# Patient Record
Sex: FEMALE | URBAN - METROPOLITAN AREA
[De-identification: names, ages, dates, MRNs, and addresses within clinical notes are randomized per-mention and may not be internally consistent; named-entity substitution may affect disease eponyms.]

---
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
## 2025-01-28 ENCOUNTER — APPOINTMENT (OUTPATIENT)
Dept: ULTRASOUND IMAGING | Age: 20
End: 2025-01-28
Payer: MEDICAID

## 2025-01-28 ENCOUNTER — HOSPITAL ENCOUNTER (EMERGENCY)
Age: 20
Discharge: HOME | End: 2025-01-28
Payer: MEDICAID

## 2025-01-28 VITALS
TEMPERATURE: 97.7 F | HEIGHT: 65 IN | RESPIRATION RATE: 14 BRPM | HEART RATE: 88 BPM | WEIGHT: 143.4 LBS | DIASTOLIC BLOOD PRESSURE: 80 MMHG | SYSTOLIC BLOOD PRESSURE: 113 MMHG | OXYGEN SATURATION: 99 % | BODY MASS INDEX: 23.89 KG/M2

## 2025-01-28 DIAGNOSIS — R10.2 PELVIC CRAMPING: ICD-10-CM

## 2025-01-28 DIAGNOSIS — Z3A.18 18 WEEKS GESTATION OF PREGNANCY: Primary | ICD-10-CM

## 2025-01-28 LAB
ABO + RH BLD: NORMAL
BILIRUB UR QL STRIP.AUTO: NEGATIVE
CLARITY UR: ABNORMAL
COLOR UR: YELLOW
GLUCOSE UR STRIP.AUTO-MCNC: NEGATIVE MG/DL
HGB UR QL STRIP.AUTO: NEGATIVE
KETONES UR STRIP.AUTO-MCNC: NEGATIVE MG/DL
LEUKOCYTE ESTERASE UR QL STRIP.AUTO: NEGATIVE
NITRITE UR QL STRIP.AUTO: NEGATIVE
PH UR STRIP.AUTO: 6 [PH] (ref 5–8)
PROT UR STRIP.AUTO-MCNC: NEGATIVE MG/DL
SP GR UR STRIP.AUTO: 1.02 (ref 1–1.03)
UA COMPLETE W REFLEX CULTURE PNL UR: ABNORMAL
UA DIPSTICK W REFLEX MICRO PNL UR: ABNORMAL
URN SPEC COLLECT METH UR: ABNORMAL
UROBILINOGEN UR STRIP-ACNC: 0.2 E.U./DL

## 2025-01-28 PROCEDURE — 81003 URINALYSIS AUTO W/O SCOPE: CPT

## 2025-01-28 PROCEDURE — 86900 BLOOD TYPING SEROLOGIC ABO: CPT

## 2025-01-28 PROCEDURE — 76805 OB US >/= 14 WKS SNGL FETUS: CPT

## 2025-01-28 PROCEDURE — 86901 BLOOD TYPING SEROLOGIC RH(D): CPT

## 2025-01-28 PROCEDURE — 99284 EMERGENCY DEPT VISIT MOD MDM: CPT

## 2025-01-28 ASSESSMENT — PAIN - FUNCTIONAL ASSESSMENT: PAIN_FUNCTIONAL_ASSESSMENT: NONE - DENIES PAIN

## 2025-01-28 NOTE — ED PROVIDER NOTES
[facility] EMERGENCY DEPARTMENT  EMERGENCY DEPARTMENT ENCOUNTER        Pt Name: Fabián Partida  MRN: [id number]  Birthdate 2005  Date of evaluation: 1/28/2025  Provider: Hellen Mays PA-C  PCP: Patrica Austin MD  Note Started: 12:53 PM EST 1/28/25      MAY. I have evaluated this patient.        CHIEF COMPLAINT       Chief Complaint   Patient presents with    Abdominal Cramping     Patient states that she is approximately 17 weeks pregnant and hasn't established with OB, states she is having lower abdominal cramping that she woke up this AM. Had \"pink\" area on toilet tissue when she wiped last night       HISTORY OF PRESENT ILLNESS: 1 or more Elements     History From: Patient and family            Chief Complaint: Abdominal cramping    Fabián Partida is a 19 y.o. female who presents G2, P0 coming in approximately 18 weeks pregnant by last normal menstrual period with bilateral lower pelvic cramping.  She states she had a mental breakdown last night when she woke up in the middle of the night she started cramping.  These are not consistent.  Seems to be improving throughout the day.  She states when she was going to the bathroom in the middle the night she noticed some pink when she wiped on the toilet paper.  She denies heavy vaginal bleeding or clots.  She denies any vomiting diarrhea fever.  She has not followed up with OB/GYN this pregnancy due to insurance issues.  she states she had a miscarriage last February.  No intervention was needed.  She did have a ultrasound around 9 weeks due to some bleeding at that time.  She states her urine does look cloudy to her but she is having no dysuria frequency or urgency.    Nursing Notes were all reviewed and agreed with or any disagreements were addressed in the HPI.    REVIEW OF SYSTEMS :      Review of Systems    Positives and Pertinent negatives as per HPI.     SURGICAL HISTORY   No past surgical history on file.    CURRENTMEDICATIONS

## 2025-01-29 NOTE — PROGRESS NOTES
Patient is not established with [facility].  [facility] is not currently on call for unassigned ER call.

## 2025-02-24 LAB
ABO, EXTERNAL RESULT: NORMAL
HEP B, EXTERNAL RESULT: NEGATIVE
HIV, EXTERNAL RESULT: NEGATIVE
RH FACTOR, EXTERNAL RESULT: POSITIVE
RPR, EXTERNAL RESULT: NONREACTIVE
RUBELLA TITER, EXTERNAL RESULT: NORMAL

## 2025-04-02 ENCOUNTER — HOSPITAL ENCOUNTER (OUTPATIENT)
Age: 20
Discharge: HOME | End: 2025-04-02
Attending: OBSTETRICS & GYNECOLOGY | Admitting: OBSTETRICS & GYNECOLOGY
Payer: MEDICAID

## 2025-04-02 ENCOUNTER — HOSPITAL ENCOUNTER (EMERGENCY)
Age: 20
Discharge: HOME | End: 2025-04-02
Attending: STUDENT IN AN ORGANIZED HEALTH CARE EDUCATION/TRAINING PROGRAM

## 2025-04-02 VITALS
TEMPERATURE: 98 F | HEIGHT: 65 IN | BODY MASS INDEX: 26.08 KG/M2 | SYSTOLIC BLOOD PRESSURE: 125 MMHG | RESPIRATION RATE: 16 BRPM | HEART RATE: 87 BPM | DIASTOLIC BLOOD PRESSURE: 62 MMHG

## 2025-04-02 VITALS
TEMPERATURE: 97.3 F | BODY MASS INDEX: 26.08 KG/M2 | RESPIRATION RATE: 17 BRPM | OXYGEN SATURATION: 98 % | DIASTOLIC BLOOD PRESSURE: 72 MMHG | SYSTOLIC BLOOD PRESSURE: 128 MMHG | HEART RATE: 111 BPM | WEIGHT: 156.75 LBS

## 2025-04-02 PROCEDURE — 99202 OFFICE O/P NEW SF 15 MIN: CPT

## 2025-04-02 RX ORDER — POLYETHYLENE GLYCOL 3350 17 G/17G
17 POWDER, FOR SOLUTION ORAL DAILY
Qty: 1 EACH | Refills: 1 | Status: SHIPPED | OUTPATIENT
Start: 2025-04-02 | End: 2025-05-02

## 2025-04-02 RX ORDER — BISACODYL 10 MG
10 SUPPOSITORY, RECTAL RECTAL DAILY
Qty: 30 SUPPOSITORY | Refills: 0 | Status: SHIPPED | OUTPATIENT
Start: 2025-04-02 | End: 2025-05-02

## 2025-04-03 NOTE — PROGRESS NOTES
Pt presents to T1 at this time with primary c/o \"I haven't felt my baby move since yesterday evening.\" Pt denies LOF or vaginal bleeding. Pt denies contractions. Pt placed on EFM and vital signs obtained and WNL. Pt also states \"I haven't pooped in 5 days and I've taken magnesium citrate and a hemorrhoid suppository.\" Will update Dr. Castillo about pt arrival and current complaint.

## 2025-04-03 NOTE — H&P
Triage History and Physical      Fabián Partida  [id number]      Subjective:   Fabián Partida is a 19 y.o.  at 27w4d by LMP who receives her prenatal care at [facility]. Patient presents with c/o not feeling FM since today and constipation with no BM since 5 days. Pt reports h/o hemorrhoids even prior to pregnancy, she reports painful BM, has been applying hemorrhoidal cream. She tried Mag citrate for constipation with no relief. She reports not drinking adequate water since few days. She also reports not eating as much. Denies ctxs, LOF or VB.     Antepartum History:  Patient Active Problem List   Diagnosis    Irritable bowel syndrome    Anxiety and depression    Hypermobile joints    Allergic rhinitis    Dysmenorrhea    Menorrhagia with regular cycle    Excessive daytime sleepiness         OB History:  OB History    Para Term  AB Living   2 0 0 0 1 0   SAB IAB Ectopic Molar Multiple Live Births   0 0 0 0 0 0      # Outcome Date GA Lbr Cleve/2nd Weight Sex Type Anes PTL Lv   2 Current            1 AB                  History:  Past Medical History:   Past Medical History:   Diagnosis Date    Autism spectrum disorder     Chronic migraine without aura     COVID-19 2024    Depression     Dysautonomia (HCC)     Dysmenorrhea     IBS (irritable bowel syndrome)     Insomnia     Irregular uterine bleeding     Lactose intolerance     Menorrhagia     OCD (obsessive compulsive disorder)     PTSD (post-traumatic stress disorder)      Past Surgical History: History reviewed. No pertinent surgical history.    Social History:   Denies EtOH, tobacco abuse or illicit drug use   Social History     Socioeconomic History    Marital status: Single     Spouse name: Not on file    Number of children: Not on file    Years of education: Not on file    Highest education level: Not on file   Occupational

## 2025-04-03 NOTE — PROGRESS NOTES
D/c instructions given. Pt verbalized understanding and denied questions. Pt left OB unit @ 2127 in stable condition, ambulatory and undelivered. Not in active labor.

## 2025-06-06 LAB — GBS, EXTERNAL RESULT: NEGATIVE

## 2025-06-27 ENCOUNTER — HOSPITAL ENCOUNTER (INPATIENT)
Age: 20
LOS: 3 days | Discharge: HOME | DRG: 560 | End: 2025-06-30
Attending: STUDENT IN AN ORGANIZED HEALTH CARE EDUCATION/TRAINING PROGRAM | Admitting: STUDENT IN AN ORGANIZED HEALTH CARE EDUCATION/TRAINING PROGRAM
Payer: MEDICAID

## 2025-06-27 ENCOUNTER — APPOINTMENT (OUTPATIENT)
Dept: LABOR AND DELIVERY | Age: 20
DRG: 560 | End: 2025-06-27
Payer: MEDICAID

## 2025-06-27 PROBLEM — Z34.90 ENCOUNTER FOR ELECTIVE INDUCTION OF LABOR: Status: ACTIVE | Noted: 2025-06-27

## 2025-06-27 LAB
ABO/RH: NORMAL
AMPHETAMINES UR QL SCN>1000 NG/ML: ABNORMAL
ANTIBODY SCREEN: NORMAL
BARBITURATES UR QL SCN>200 NG/ML: ABNORMAL
BASOPHILS # BLD: 0.1 K/UL (ref 0–0.2)
BASOPHILS NFR BLD: 0.5 %
BENZODIAZ UR QL SCN>200 NG/ML: ABNORMAL
BUPRENORPHINE+NOR UR QL SCN: ABNORMAL
CANNABINOIDS UR QL SCN>50 NG/ML: POSITIVE
COCAINE UR QL SCN: ABNORMAL
DEPRECATED RDW RBC AUTO: 15.5 % (ref 12.4–15.4)
DRUG SCREEN COMMENT UR-IMP: ABNORMAL
EOSINOPHIL # BLD: 0.1 K/UL (ref 0–0.6)
EOSINOPHIL NFR BLD: 1.1 %
FENTANYL SCREEN, URINE: ABNORMAL
HCT VFR BLD AUTO: 31.3 % (ref 36–48)
HGB BLD-MCNC: 10.1 G/DL (ref 12–16)
LYMPHOCYTES # BLD: 2 K/UL (ref 1–5.1)
LYMPHOCYTES NFR BLD: 15.4 %
MCH RBC QN AUTO: 24.1 PG (ref 26–34)
MCHC RBC AUTO-ENTMCNC: 32.3 G/DL (ref 31–36)
MCV RBC AUTO: 74.7 FL (ref 80–100)
METHADONE UR QL SCN>300 NG/ML: ABNORMAL
MONOCYTES # BLD: 1.3 K/UL (ref 0–1.3)
MONOCYTES NFR BLD: 10.2 %
NEUTROPHILS # BLD: 9.3 K/UL (ref 1.7–7.7)
NEUTROPHILS NFR BLD: 72.8 %
OPIATES UR QL SCN>300 NG/ML: ABNORMAL
OXYCODONE UR QL SCN: ABNORMAL
PCP UR QL SCN>25 NG/ML: ABNORMAL
PH UR STRIP: 6 [PH]
PLATELET # BLD AUTO: 273 K/UL (ref 135–450)
PMV BLD AUTO: 8.4 FL (ref 5–10.5)
RBC # BLD AUTO: 4.19 M/UL (ref 4–5.2)
WBC # BLD AUTO: 12.8 K/UL (ref 4–11)

## 2025-06-27 PROCEDURE — 6370000000 HC RX 637 (ALT 250 FOR IP): Performed by: STUDENT IN AN ORGANIZED HEALTH CARE EDUCATION/TRAINING PROGRAM

## 2025-06-27 PROCEDURE — 86901 BLOOD TYPING SEROLOGIC RH(D): CPT

## 2025-06-27 PROCEDURE — 86780 TREPONEMA PALLIDUM: CPT

## 2025-06-27 PROCEDURE — 85025 COMPLETE CBC W/AUTO DIFF WBC: CPT

## 2025-06-27 PROCEDURE — 1220000000 HC SEMI PRIVATE OB R&B

## 2025-06-27 PROCEDURE — 6360000002 HC RX W HCPCS: Performed by: STUDENT IN AN ORGANIZED HEALTH CARE EDUCATION/TRAINING PROGRAM

## 2025-06-27 PROCEDURE — 86900 BLOOD TYPING SEROLOGIC ABO: CPT

## 2025-06-27 PROCEDURE — 80307 DRUG TEST PRSMV CHEM ANLYZR: CPT

## 2025-06-27 PROCEDURE — 2580000003 HC RX 258: Performed by: STUDENT IN AN ORGANIZED HEALTH CARE EDUCATION/TRAINING PROGRAM

## 2025-06-27 PROCEDURE — 86850 RBC ANTIBODY SCREEN: CPT

## 2025-06-27 RX ORDER — SODIUM CHLORIDE, SODIUM LACTATE, POTASSIUM CHLORIDE, AND CALCIUM CHLORIDE .6; .31; .03; .02 G/100ML; G/100ML; G/100ML; G/100ML
1000 INJECTION, SOLUTION INTRAVENOUS PRN
Status: DISCONTINUED | OUTPATIENT
Start: 2025-06-27 | End: 2025-06-28

## 2025-06-27 RX ORDER — ONDANSETRON 2 MG/ML
4 INJECTION INTRAMUSCULAR; INTRAVENOUS EVERY 6 HOURS PRN
Status: DISCONTINUED | OUTPATIENT
Start: 2025-06-27 | End: 2025-06-28

## 2025-06-27 RX ORDER — TRANEXAMIC ACID 10 MG/ML
1000 INJECTION, SOLUTION INTRAVENOUS
Status: DISCONTINUED | OUTPATIENT
Start: 2025-06-27 | End: 2025-06-28

## 2025-06-27 RX ORDER — SODIUM CHLORIDE 0.9 % (FLUSH) 0.9 %
5-40 SYRINGE (ML) INJECTION EVERY 12 HOURS SCHEDULED
Status: DISCONTINUED | OUTPATIENT
Start: 2025-06-27 | End: 2025-06-28

## 2025-06-27 RX ORDER — SODIUM CHLORIDE, SODIUM LACTATE, POTASSIUM CHLORIDE, AND CALCIUM CHLORIDE .6; .31; .03; .02 G/100ML; G/100ML; G/100ML; G/100ML
500 INJECTION, SOLUTION INTRAVENOUS PRN
Status: DISCONTINUED | OUTPATIENT
Start: 2025-06-27 | End: 2025-06-28

## 2025-06-27 RX ORDER — NALBUPHINE HYDROCHLORIDE 10 MG/ML
10 INJECTION INTRAMUSCULAR; INTRAVENOUS; SUBCUTANEOUS
Status: DISCONTINUED | OUTPATIENT
Start: 2025-06-27 | End: 2025-06-28

## 2025-06-27 RX ORDER — SODIUM CHLORIDE, SODIUM LACTATE, POTASSIUM CHLORIDE, CALCIUM CHLORIDE 600; 310; 30; 20 MG/100ML; MG/100ML; MG/100ML; MG/100ML
INJECTION, SOLUTION INTRAVENOUS CONTINUOUS
Status: DISCONTINUED | OUTPATIENT
Start: 2025-06-27 | End: 2025-06-28

## 2025-06-27 RX ORDER — CARBOPROST TROMETHAMINE 250 UG/ML
250 INJECTION, SOLUTION INTRAMUSCULAR PRN
Status: DISCONTINUED | OUTPATIENT
Start: 2025-06-27 | End: 2025-06-28

## 2025-06-27 RX ORDER — MISOPROSTOL 100 UG/1
400 TABLET ORAL PRN
Status: DISCONTINUED | OUTPATIENT
Start: 2025-06-27 | End: 2025-06-28

## 2025-06-27 RX ORDER — SODIUM CHLORIDE 0.9 % (FLUSH) 0.9 %
5-40 SYRINGE (ML) INJECTION PRN
Status: DISCONTINUED | OUTPATIENT
Start: 2025-06-27 | End: 2025-06-28

## 2025-06-27 RX ORDER — ONDANSETRON 4 MG/1
4 TABLET, ORALLY DISINTEGRATING ORAL EVERY 8 HOURS PRN
Status: DISCONTINUED | OUTPATIENT
Start: 2025-06-27 | End: 2025-06-28

## 2025-06-27 RX ORDER — SODIUM CHLORIDE 9 MG/ML
INJECTION, SOLUTION INTRAVENOUS PRN
Status: DISCONTINUED | OUTPATIENT
Start: 2025-06-27 | End: 2025-06-28

## 2025-06-27 RX ORDER — DOCUSATE SODIUM 100 MG/1
100 CAPSULE, LIQUID FILLED ORAL 2 TIMES DAILY
Status: DISCONTINUED | OUTPATIENT
Start: 2025-06-27 | End: 2025-06-28

## 2025-06-27 RX ORDER — BUTORPHANOL TARTRATE 1 MG/ML
1 INJECTION, SOLUTION INTRAMUSCULAR; INTRAVENOUS
Status: DISCONTINUED | OUTPATIENT
Start: 2025-06-27 | End: 2025-06-28

## 2025-06-27 RX ORDER — METHYLERGONOVINE MALEATE 0.2 MG/ML
200 INJECTION INTRAVENOUS PRN
Status: DISCONTINUED | OUTPATIENT
Start: 2025-06-27 | End: 2025-06-28

## 2025-06-27 RX ORDER — TERBUTALINE SULFATE 1 MG/ML
0.25 INJECTION SUBCUTANEOUS
Status: DISCONTINUED | OUTPATIENT
Start: 2025-06-27 | End: 2025-06-28

## 2025-06-27 RX ADMIN — NALBUPHINE HYDROCHLORIDE 10 MG: 10 INJECTION INTRAMUSCULAR; INTRAVENOUS; SUBCUTANEOUS at 22:56

## 2025-06-27 RX ADMIN — Medication 50 MCG: at 20:20

## 2025-06-27 RX ADMIN — SODIUM CHLORIDE, SODIUM LACTATE, POTASSIUM CHLORIDE, AND CALCIUM CHLORIDE 500 ML: .6; .31; .03; .02 INJECTION, SOLUTION INTRAVENOUS at 21:12

## 2025-06-27 ASSESSMENT — PAIN DESCRIPTION - LOCATION: LOCATION: ABDOMEN

## 2025-06-27 ASSESSMENT — PAIN DESCRIPTION - DESCRIPTORS: DESCRIPTORS: ACHING

## 2025-06-27 ASSESSMENT — PAIN SCALES - GENERAL: PAINLEVEL_OUTOF10: 6

## 2025-06-27 ASSESSMENT — PAIN DESCRIPTION - ORIENTATION: ORIENTATION: LOWER

## 2025-06-28 ENCOUNTER — ANESTHESIA EVENT (OUTPATIENT)
Dept: LABOR AND DELIVERY | Age: 20
DRG: 560 | End: 2025-06-28
Payer: MEDICAID

## 2025-06-28 ENCOUNTER — ANESTHESIA (OUTPATIENT)
Dept: LABOR AND DELIVERY | Age: 20
DRG: 560 | End: 2025-06-28
Payer: MEDICAID

## 2025-06-28 PROBLEM — Z37.9 VACUUM-ASSISTED VAGINAL DELIVERY: Status: ACTIVE | Noted: 2025-06-28

## 2025-06-28 LAB — REAGIN+T PALLIDUM IGG+IGM SERPL-IMP: NORMAL

## 2025-06-28 PROCEDURE — 6370000000 HC RX 637 (ALT 250 FOR IP)

## 2025-06-28 PROCEDURE — 6360000002 HC RX W HCPCS: Performed by: STUDENT IN AN ORGANIZED HEALTH CARE EDUCATION/TRAINING PROGRAM

## 2025-06-28 PROCEDURE — 6360000002 HC RX W HCPCS: Performed by: REGISTERED NURSE

## 2025-06-28 PROCEDURE — 0KQM0ZZ REPAIR PERINEUM MUSCLE, OPEN APPROACH: ICD-10-PCS | Performed by: STUDENT IN AN ORGANIZED HEALTH CARE EDUCATION/TRAINING PROGRAM

## 2025-06-28 PROCEDURE — 2580000003 HC RX 258: Performed by: STUDENT IN AN ORGANIZED HEALTH CARE EDUCATION/TRAINING PROGRAM

## 2025-06-28 PROCEDURE — 1220000000 HC SEMI PRIVATE OB R&B

## 2025-06-28 PROCEDURE — 7200000001 HC VAGINAL DELIVERY

## 2025-06-28 PROCEDURE — 2500000003 HC RX 250 WO HCPCS: Performed by: REGISTERED NURSE

## 2025-06-28 PROCEDURE — 6370000000 HC RX 637 (ALT 250 FOR IP): Performed by: STUDENT IN AN ORGANIZED HEALTH CARE EDUCATION/TRAINING PROGRAM

## 2025-06-28 PROCEDURE — 2500000003 HC RX 250 WO HCPCS: Performed by: STUDENT IN AN ORGANIZED HEALTH CARE EDUCATION/TRAINING PROGRAM

## 2025-06-28 PROCEDURE — 3E0DXGC INTRODUCTION OF OTHER THERAPEUTIC SUBSTANCE INTO MOUTH AND PHARYNX, EXTERNAL APPROACH: ICD-10-PCS | Performed by: STUDENT IN AN ORGANIZED HEALTH CARE EDUCATION/TRAINING PROGRAM

## 2025-06-28 PROCEDURE — 3700000025 EPIDURAL BLOCK: Performed by: ANESTHESIOLOGY

## 2025-06-28 RX ORDER — SIMETHICONE 80 MG
80 TABLET,CHEWABLE ORAL EVERY 6 HOURS PRN
Status: DISCONTINUED | OUTPATIENT
Start: 2025-06-28 | End: 2025-06-30 | Stop reason: HOSPADM

## 2025-06-28 RX ORDER — LIDOCAINE HYDROCHLORIDE AND EPINEPHRINE BITARTRATE 20; .01 MG/ML; MG/ML
INJECTION, SOLUTION SUBCUTANEOUS
Status: DISCONTINUED | OUTPATIENT
Start: 2025-06-28 | End: 2025-06-28 | Stop reason: SDUPTHER

## 2025-06-28 RX ORDER — SODIUM CHLORIDE 0.9 % (FLUSH) 0.9 %
5-40 SYRINGE (ML) INJECTION EVERY 12 HOURS SCHEDULED
Status: DISCONTINUED | OUTPATIENT
Start: 2025-06-28 | End: 2025-06-30 | Stop reason: HOSPADM

## 2025-06-28 RX ORDER — BUPROPION HYDROCHLORIDE 150 MG/1
150 TABLET ORAL DAILY
Status: DISCONTINUED | OUTPATIENT
Start: 2025-06-28 | End: 2025-06-30 | Stop reason: HOSPADM

## 2025-06-28 RX ORDER — ONDANSETRON 4 MG/1
4 TABLET, ORALLY DISINTEGRATING ORAL EVERY 6 HOURS PRN
Status: DISCONTINUED | OUTPATIENT
Start: 2025-06-28 | End: 2025-06-30 | Stop reason: HOSPADM

## 2025-06-28 RX ORDER — FAMOTIDINE 20 MG/1
20 TABLET, FILM COATED ORAL 2 TIMES DAILY
Status: DISCONTINUED | OUTPATIENT
Start: 2025-06-28 | End: 2025-06-30 | Stop reason: HOSPADM

## 2025-06-28 RX ORDER — SODIUM CHLORIDE 9 MG/ML
INJECTION, SOLUTION INTRAVENOUS PRN
Status: DISCONTINUED | OUTPATIENT
Start: 2025-06-28 | End: 2025-06-30 | Stop reason: HOSPADM

## 2025-06-28 RX ORDER — PRENATAL WITH FERROUS FUM AND FOLIC ACID 3080; 920; 120; 400; 22; 1.84; 3; 20; 10; 1; 12; 200; 27; 25; 2 [IU]/1; [IU]/1; MG/1; [IU]/1; MG/1; MG/1; MG/1; MG/1; MG/1; MG/1; UG/1; MG/1; MG/1; MG/1; MG/1
1 TABLET ORAL DAILY
Status: DISCONTINUED | OUTPATIENT
Start: 2025-06-28 | End: 2025-06-30 | Stop reason: HOSPADM

## 2025-06-28 RX ORDER — OXYCODONE HYDROCHLORIDE 5 MG/1
5 TABLET ORAL EVERY 4 HOURS PRN
Status: DISCONTINUED | OUTPATIENT
Start: 2025-06-28 | End: 2025-06-30 | Stop reason: HOSPADM

## 2025-06-28 RX ORDER — FERROUS SULFATE 325(65) MG
325 TABLET ORAL
Status: DISCONTINUED | OUTPATIENT
Start: 2025-06-29 | End: 2025-06-30 | Stop reason: HOSPADM

## 2025-06-28 RX ORDER — BUPIVACAINE HYDROCHLORIDE 5 MG/ML
INJECTION, SOLUTION EPIDURAL; INTRACAUDAL; PERINEURAL
Status: DISCONTINUED | OUTPATIENT
Start: 2025-06-28 | End: 2025-06-28 | Stop reason: SDUPTHER

## 2025-06-28 RX ORDER — DOCUSATE SODIUM 100 MG/1
100 CAPSULE, LIQUID FILLED ORAL 2 TIMES DAILY
Status: DISCONTINUED | OUTPATIENT
Start: 2025-06-28 | End: 2025-06-30 | Stop reason: HOSPADM

## 2025-06-28 RX ORDER — ONDANSETRON 2 MG/ML
4 INJECTION INTRAMUSCULAR; INTRAVENOUS EVERY 6 HOURS PRN
Status: DISCONTINUED | OUTPATIENT
Start: 2025-06-28 | End: 2025-06-30 | Stop reason: HOSPADM

## 2025-06-28 RX ORDER — SODIUM CHLORIDE, SODIUM LACTATE, POTASSIUM CHLORIDE, CALCIUM CHLORIDE 600; 310; 30; 20 MG/100ML; MG/100ML; MG/100ML; MG/100ML
INJECTION, SOLUTION INTRAVENOUS CONTINUOUS
Status: DISCONTINUED | OUTPATIENT
Start: 2025-06-28 | End: 2025-06-30 | Stop reason: HOSPADM

## 2025-06-28 RX ORDER — ACETAMINOPHEN 500 MG
1000 TABLET ORAL EVERY 8 HOURS
Status: DISCONTINUED | OUTPATIENT
Start: 2025-06-28 | End: 2025-06-30 | Stop reason: HOSPADM

## 2025-06-28 RX ORDER — SODIUM CHLORIDE 0.9 % (FLUSH) 0.9 %
5-40 SYRINGE (ML) INJECTION PRN
Status: DISCONTINUED | OUTPATIENT
Start: 2025-06-28 | End: 2025-06-30 | Stop reason: HOSPADM

## 2025-06-28 RX ORDER — IBUPROFEN 800 MG/1
TABLET, FILM COATED ORAL
Status: COMPLETED
Start: 2025-06-28 | End: 2025-06-28

## 2025-06-28 RX ORDER — IBUPROFEN 800 MG/1
800 TABLET, FILM COATED ORAL EVERY 8 HOURS
Status: DISCONTINUED | OUTPATIENT
Start: 2025-06-28 | End: 2025-06-30 | Stop reason: HOSPADM

## 2025-06-28 RX ORDER — EPHEDRINE SULFATE 50 MG/ML
INJECTION INTRAVENOUS
Status: DISCONTINUED
Start: 2025-06-28 | End: 2025-06-28

## 2025-06-28 RX ADMIN — IBUPROFEN 800 MG: 800 TABLET, FILM COATED ORAL at 21:42

## 2025-06-28 RX ADMIN — ACETAMINOPHEN 1000 MG: 500 TABLET ORAL at 18:09

## 2025-06-28 RX ADMIN — Medication 10 ML: at 21:43

## 2025-06-28 RX ADMIN — SODIUM CHLORIDE, SODIUM LACTATE, POTASSIUM CHLORIDE, AND CALCIUM CHLORIDE 1000 ML: .6; .31; .03; .02 INJECTION, SOLUTION INTRAVENOUS at 01:42

## 2025-06-28 RX ADMIN — Medication 15 ML/HR: at 00:54

## 2025-06-28 RX ADMIN — BUPIVACAINE HYDROCHLORIDE 10 ML: 5 INJECTION, SOLUTION EPIDURAL; INTRACAUDAL; PERINEURAL at 01:47

## 2025-06-28 RX ADMIN — IBUPROFEN 800 MG: 800 TABLET, FILM COATED ORAL at 10:10

## 2025-06-28 RX ADMIN — LIDOCAINE HYDROCHLORIDE AND EPINEPHRINE 5 ML: 20; 10 INJECTION, SOLUTION INFILTRATION; PERINEURAL at 00:45

## 2025-06-28 RX ADMIN — ONDANSETRON 4 MG: 2 INJECTION, SOLUTION INTRAMUSCULAR; INTRAVENOUS at 05:38

## 2025-06-28 RX ADMIN — DOCUSATE SODIUM 100 MG: 100 CAPSULE, LIQUID FILLED ORAL at 21:42

## 2025-06-28 RX ADMIN — FAMOTIDINE 20 MG: 10 INJECTION, SOLUTION INTRAVENOUS at 06:55

## 2025-06-28 RX ADMIN — BUPROPION HYDROCHLORIDE 150 MG: 150 TABLET, EXTENDED RELEASE ORAL at 20:20

## 2025-06-28 ASSESSMENT — PAIN DESCRIPTION - LOCATION
LOCATION: PERINEUM
LOCATION: BACK;PERINEUM

## 2025-06-28 ASSESSMENT — PAIN DESCRIPTION - DESCRIPTORS
DESCRIPTORS: DISCOMFORT;SORE
DESCRIPTORS: SORE

## 2025-06-28 ASSESSMENT — PAIN SCALES - GENERAL: PAINLEVEL_OUTOF10: 4

## 2025-06-28 NOTE — L&D DELIVERY NOTE
Department of Obstetrics and Gynecology  Vacuum assisted Vaginal Delivery Note      Pre-operative Diagnosis:  Term pregnancy and Induced labor    Post-operative Diagnosis:  Living  infant(s) and Male    Information for the patient's :  Jed Partida [[id number]]                  Infant Wt:   Information for the patient's :  Jed Partida [[id number]]         APGARS:     Information for the patient's :  Jed Partida [[id number]]         Anesthesia:  epidural anesthesia    Application and Delivery:      She was placed in the dorsal lithotomy position, prepped and draped in the normal sterile fashion. She pushed for ~1.5 hr. Discussed recommendation for vacuum delivery given persistent category 2 fetal heart tone tracing with variable and late decelerations. Risks of vacuum delivery discussed with patient including but not limited to intracranial hemorrhage and cephalohematoma. Kiwi vacuum placed at +2 station. Check done to ensure no maternal tissue was included.  Pressure set to 100 mmHg in between contractions. At the start of the next contraction the pressure was increased to 500 mmHg for 1 pull and no pop offs. There was delivery of the head GIL position, pressure on the vacuum released. The anterior and posterior shoulder, and the remainder of the infant delivered without difficulty. The umbilical cord was noted to be pinch against infant's chest.  Infant was placed on mother's abdomen. The cord was doubly clamped. The father of the baby cut the cord. The infant was taken to the warmer for assessment. Cord blood was obtained. The 3 VC intact placenta spontaneously delivered. Fundal massage was performed until the fundus was firm.    Inspection of the perineum revealed a second degree midline laceration which was repaired, with 3.0 Vicryl, in the normal sterile fashion.  Two additional interrupted sutures were placed using 4-0 Vicryl to re approximate skin at

## 2025-06-28 NOTE — PLAN OF CARE
Problem: Vaginal Birth or  Section  Goal: Fetal and maternal status remain reassuring during the birth process  Description:  Birth OB-Pregnancy care plan goal which identifies if the fetal and maternal status remain reassuring during the birth process  Outcome: Progressing     Problem: Postpartum  Goal: Experiences normal postpartum course  Description:  Postpartum OB-Pregnancy care plan goal which identifies if the mother is experiencing a normal postpartum course  Outcome: Progressing  Goal: Appropriate maternal -  bonding  Description:  Postpartum OB-Pregnancy care plan goal which identifies if the mother and  are bonding appropriately  Outcome: Progressing  Goal: Establishment of infant feeding pattern  Description:  Postpartum OB-Pregnancy care plan goal which identifies if the mother is establishing a feeding pattern with their   Outcome: Progressing  Goal: Incisions, wounds, or drain sites healing without S/S of infection  Outcome: Progressing     Problem: Pain  Goal: Verbalizes/displays adequate comfort level or baseline comfort level  Outcome: Progressing     Problem: Infection - Adult  Goal: Absence of infection at discharge  Outcome: Progressing  Goal: Absence of infection during hospitalization  Outcome: Progressing  Goal: Absence of fever/infection during anticipated neutropenic period  Outcome: Progressing     Problem: Safety - Adult  Goal: Free from fall injury  Outcome: Progressing     Problem: Chronic Conditions and Co-morbidities  Goal: Patient's chronic conditions and co-morbidity symptoms are monitored and maintained or improved  Outcome: Progressing

## 2025-06-28 NOTE — ANESTHESIA PROCEDURE NOTES
Epidural Block    Patient location during procedure: OB  Start time: 6/28/2025 12:42 AM  End time: 6/28/2025 12:45 AM  Reason for block: labor epidural  Staffing  Performed: resident/CRNA   Resident/CRNA: Aleta Navarro APRN - CRNA  Performed by: Aleta Navarro APRN - CRNA  Authorized by: Sunil Garcia MD    Epidural  Patient position: sitting  Prep: ChloraPrep  Patient monitoring: continuous pulse ox and frequent blood pressure checks  Approach: midline  Location: L2-3  Injection technique: GREG saline  Provider prep: mask and sterile gloves  Needle  Needle type: Tuohy   Needle length: 3.5 in  Needle insertion depth: 5 cm  Catheter type: multi-orifice  Catheter at skin depth: 10 cm  Test dose: negativeCatheter Secured: tegaderm and tape  Assessment  Hemodynamics: stable  Attempts: 1  Outcomes: uncomplicated and patient tolerated procedure well  Preanesthetic Checklist  Completed: patient identified, IV checked, site marked, risks and benefits discussed, surgical/procedural consents, equipment checked, pre-op evaluation, timeout performed, anesthesia consent given, oxygen available, monitors applied/VS acknowledged, fire risk safety assessment completed and verbalized and blood product R/B/A discussed and consented

## 2025-06-28 NOTE — PLAN OF CARE
Problem: Vaginal Birth or  Section  Goal: Fetal and maternal status remain reassuring during the birth process  Description:  Birth OB-Pregnancy care plan goal which identifies if the fetal and maternal status remain reassuring during the birth process  Outcome: Progressing     Problem: Pain  Goal: Verbalizes/displays adequate comfort level or baseline comfort level  Outcome: Progressing     Problem: Infection - Adult  Goal: Absence of infection at discharge  Outcome: Progressing  Goal: Absence of infection during hospitalization  Outcome: Progressing  Goal: Absence of fever/infection during anticipated neutropenic period  Outcome: Progressing     Problem: Infection - Adult  Goal: Absence of infection during hospitalization  Outcome: Progressing     Problem: Infection - Adult  Goal: Absence of fever/infection during anticipated neutropenic period  Outcome: Progressing     Problem: Discharge Planning  Goal: Discharge to home or other facility with appropriate resources  Outcome: Progressing     Problem: Chronic Conditions and Co-morbidities  Goal: Patient's chronic conditions and co-morbidity symptoms are monitored and maintained or improved  Outcome: Progressing

## 2025-06-28 NOTE — ANESTHESIA PRE PROCEDURE
= 3 FB   Dental: normal exam         Pulmonary:Negative Pulmonary ROS                              Cardiovascular:Negative CV ROS                      Neuro/Psych:   (+) headaches:, psychiatric history:depression/anxiety              ROS comment: Scoliosis, hypermobility GI/Hepatic/Renal:            ROS comment: IBS.   Endo/Other: Negative Endo/Other ROS                    Abdominal:             Vascular:          Other Findings:             Anesthesia Plan      epidural     ASA 2             Anesthetic plan and risks discussed with patient.      Plan discussed with CRNA.                    ZAID Song - CRNA   6/28/2025

## 2025-06-29 LAB
BASOPHILS # BLD: 0 K/UL (ref 0–0.2)
BASOPHILS NFR BLD: 0.3 %
DEPRECATED RDW RBC AUTO: 16 % (ref 12.4–15.4)
EOSINOPHIL # BLD: 0.2 K/UL (ref 0–0.6)
EOSINOPHIL NFR BLD: 1.4 %
HCT VFR BLD AUTO: 29.2 % (ref 36–48)
HGB BLD-MCNC: 9.6 G/DL (ref 12–16)
LYMPHOCYTES # BLD: 1.8 K/UL (ref 1–5.1)
LYMPHOCYTES NFR BLD: 13.2 %
MCH RBC QN AUTO: 24.5 PG (ref 26–34)
MCHC RBC AUTO-ENTMCNC: 32.7 G/DL (ref 31–36)
MCV RBC AUTO: 74.8 FL (ref 80–100)
MONOCYTES # BLD: 1.2 K/UL (ref 0–1.3)
NEUTROPHILS # BLD: 10.3 K/UL (ref 1.7–7.7)
NEUTROPHILS NFR BLD: 76 %
PLATELET # BLD AUTO: 301 K/UL (ref 135–450)
RBC # BLD AUTO: 3.91 M/UL (ref 4–5.2)
WBC # BLD AUTO: 13.6 K/UL (ref 4–11)

## 2025-06-29 PROCEDURE — 85025 COMPLETE CBC W/AUTO DIFF WBC: CPT

## 2025-06-29 PROCEDURE — 1220000000 HC SEMI PRIVATE OB R&B

## 2025-06-29 PROCEDURE — 6370000000 HC RX 637 (ALT 250 FOR IP): Performed by: STUDENT IN AN ORGANIZED HEALTH CARE EDUCATION/TRAINING PROGRAM

## 2025-06-29 PROCEDURE — 36415 COLL VENOUS BLD VENIPUNCTURE: CPT

## 2025-06-29 PROCEDURE — 2500000003 HC RX 250 WO HCPCS: Performed by: STUDENT IN AN ORGANIZED HEALTH CARE EDUCATION/TRAINING PROGRAM

## 2025-06-29 RX ORDER — HYDROXYZINE PAMOATE 25 MG/1
25 CAPSULE ORAL 3 TIMES DAILY PRN
Status: DISCONTINUED | OUTPATIENT
Start: 2025-06-29 | End: 2025-06-30 | Stop reason: HOSPADM

## 2025-06-29 RX ADMIN — ACETAMINOPHEN 1000 MG: 500 TABLET ORAL at 17:57

## 2025-06-29 RX ADMIN — ACETAMINOPHEN 1000 MG: 500 TABLET ORAL at 01:40

## 2025-06-29 RX ADMIN — Medication 10 ML: at 10:00

## 2025-06-29 RX ADMIN — IBUPROFEN 800 MG: 800 TABLET, FILM COATED ORAL at 22:48

## 2025-06-29 RX ADMIN — DOCUSATE SODIUM 100 MG: 100 CAPSULE, LIQUID FILLED ORAL at 09:58

## 2025-06-29 RX ADMIN — Medication 10 ML: at 22:50

## 2025-06-29 RX ADMIN — HYDROXYZINE PAMOATE 25 MG: 25 CAPSULE ORAL at 22:48

## 2025-06-29 RX ADMIN — PRENATAL WITH FERROUS FUM AND FOLIC ACID 1 TABLET: 3080; 920; 120; 400; 22; 1.84; 3; 20; 10; 1; 12; 200; 27; 25; 2 TABLET ORAL at 09:58

## 2025-06-29 RX ADMIN — ACETAMINOPHEN 1000 MG: 500 TABLET ORAL at 09:58

## 2025-06-29 RX ADMIN — IBUPROFEN 800 MG: 800 TABLET, FILM COATED ORAL at 05:40

## 2025-06-29 RX ADMIN — DOCUSATE SODIUM 100 MG: 100 CAPSULE, LIQUID FILLED ORAL at 22:48

## 2025-06-29 RX ADMIN — FERROUS SULFATE TAB 325 MG (65 MG ELEMENTAL FE) 325 MG: 325 (65 FE) TAB at 09:58

## 2025-06-29 RX ADMIN — BUPROPION HYDROCHLORIDE 150 MG: 150 TABLET, EXTENDED RELEASE ORAL at 20:17

## 2025-06-29 RX ADMIN — FAMOTIDINE 20 MG: 20 TABLET, FILM COATED ORAL at 22:48

## 2025-06-29 RX ADMIN — IBUPROFEN 800 MG: 800 TABLET, FILM COATED ORAL at 14:02

## 2025-06-29 ASSESSMENT — PAIN SCALES - GENERAL
PAINLEVEL_OUTOF10: 2
PAINLEVEL_OUTOF10: 2
PAINLEVEL_OUTOF10: 3
PAINLEVEL_OUTOF10: 2
PAINLEVEL_OUTOF10: 2

## 2025-06-29 ASSESSMENT — PAIN DESCRIPTION - DESCRIPTORS
DESCRIPTORS: ACHING;DISCOMFORT;SORE

## 2025-06-29 ASSESSMENT — PAIN DESCRIPTION - LOCATION
LOCATION: PERINEUM

## 2025-06-29 NOTE — PLAN OF CARE
Problem: Postpartum  Goal: Experiences normal postpartum course  Description:  Postpartum OB-Pregnancy care plan goal which identifies if the mother is experiencing a normal postpartum course  2025 by Yvette Jameson RN  Outcome: Progressing  2025 183 by Mile Chowdary RN  Outcome: Progressing  Goal: Appropriate maternal -  bonding  Description:  Postpartum OB-Pregnancy care plan goal which identifies if the mother and  are bonding appropriately  2025 by Yvette Jameson RN  Outcome: Progressing  2025 183 by Mile Chowdary RN  Outcome: Progressing  Goal: Establishment of infant feeding pattern  Description:  Postpartum OB-Pregnancy care plan goal which identifies if the mother is establishing a feeding pattern with their   2025 by Yvette Jameson RN  Outcome: Progressing  2025 183 by Mile Chowdary RN  Outcome: Progressing  Goal: Incisions, wounds, or drain sites healing without S/S of infection  2025 by Yvette Jameson RN  Outcome: Progressing  2025 183 by Mile Chowdary RN  Outcome: Progressing     Problem: Pain  Goal: Verbalizes/displays adequate comfort level or baseline comfort level  2025 by Yvette Jameson RN  Outcome: Progressing  2025 by Mile Chowdary RN  Outcome: Progressing     Problem: Infection - Adult  Goal: Absence of infection at discharge  2025 183 by Mile Chowdary RN  Outcome: Progressing  Goal: Absence of infection during hospitalization  2025 by Yvette Jameson RN  Outcome: Progressing  2025 183 by Mile Chowdary RN  Outcome: Progressing  Goal: Absence of fever/infection during anticipated neutropenic period  2025 by Yvette Jameson RN  Outcome: Progressing  2025 by Mile Chowdary RN  Outcome: Progressing     Problem: Safety - Adult  Goal: Free from fall injury  2025 by

## 2025-06-29 NOTE — ANESTHESIA POSTPROCEDURE EVALUATION
Department of Anesthesiology  Postprocedure Note    Patient: Fabián Partida  MRN: [id number]  YOB: 2005  Date of evaluation: 6/29/2025    Procedure Summary       Date: 06/28/25 Room / Location:     Anesthesia Start: 0026 Anesthesia Stop: 0717    Procedure: Labor Analgesia Diagnosis:     Scheduled Providers:  Responsible Provider: Sunil Garcia MD    Anesthesia Type: epidural ASA Status: 2            Anesthesia Type: No value filed.    Vandana Phase I:      Vandana Phase II:      Anesthesia Post Evaluation    Patient location during evaluation: bedside  Patient participation: complete - patient participated  Level of consciousness: awake and alert  Airway patency: patent  Nausea & Vomiting: no nausea and no vomiting  Cardiovascular status: hemodynamically stable  Respiratory status: acceptable  Hydration status: stable  Pain management: adequate        No notable events documented.

## 2025-06-29 NOTE — PLAN OF CARE
Problem: Postpartum  Goal: Experiences normal postpartum course  Description:  Postpartum OB-Pregnancy care plan goal which identifies if the mother is experiencing a normal postpartum course  2025 1325 by Cindy Sheffield RN  Outcome: Progressing  2025 015 by Yvette Jameson RN  Outcome: Progressing  Goal: Appropriate maternal -  bonding  Description:  Postpartum OB-Pregnancy care plan goal which identifies if the mother and  are bonding appropriately  2025 132 by Cindy Sheffield RN  Outcome: Progressing  2025 015 by Yvette Jameson RN  Outcome: Progressing  Goal: Establishment of infant feeding pattern  Description:  Postpartum OB-Pregnancy care plan goal which identifies if the mother is establishing a feeding pattern with their   2025 132 by Cindy Sheffield RN  Outcome: Progressing  2025 by Yvette Jameson RN  Outcome: Progressing  Goal: Incisions, wounds, or drain sites healing without S/S of infection  2025 1325 by Cindy Sheffield RN  Outcome: Progressing  2025 by Yvette Jameson RN  Outcome: Progressing     Problem: Pain  Goal: Verbalizes/displays adequate comfort level or baseline comfort level  2025 132 by Cindy Sheffield RN  Outcome: Progressing  2025 by Yvette Jameson RN  Outcome: Progressing

## 2025-06-29 NOTE — FLOWSHEET NOTE
I collected the completed birth certificate and witness the affidavit with fellow co-worker Verna Wright RN. FOB, MARCELLE has left the hospital to go back to his rehab program.

## 2025-06-30 VITALS
WEIGHT: 169.9 LBS | OXYGEN SATURATION: 99 % | HEART RATE: 86 BPM | RESPIRATION RATE: 16 BRPM | SYSTOLIC BLOOD PRESSURE: 116 MMHG | TEMPERATURE: 97.8 F | BODY MASS INDEX: 27.84 KG/M2 | DIASTOLIC BLOOD PRESSURE: 67 MMHG

## 2025-06-30 PROCEDURE — 6370000000 HC RX 637 (ALT 250 FOR IP): Performed by: STUDENT IN AN ORGANIZED HEALTH CARE EDUCATION/TRAINING PROGRAM

## 2025-06-30 RX ORDER — FERROUS SULFATE 325(65) MG
325 TABLET ORAL
Qty: 30 TABLET | Refills: 0 | Status: SHIPPED | OUTPATIENT
Start: 2025-07-01

## 2025-06-30 RX ORDER — IBUPROFEN 800 MG/1
800 TABLET, FILM COATED ORAL EVERY 8 HOURS PRN
Qty: 30 TABLET | Refills: 0 | Status: SHIPPED | OUTPATIENT
Start: 2025-06-30

## 2025-06-30 RX ORDER — BUPROPION HYDROCHLORIDE 150 MG/1
150 TABLET ORAL DAILY
Qty: 30 TABLET | Refills: 0 | Status: SHIPPED | OUTPATIENT
Start: 2025-06-30

## 2025-06-30 RX ADMIN — ACETAMINOPHEN 1000 MG: 500 TABLET ORAL at 09:14

## 2025-06-30 RX ADMIN — IBUPROFEN 800 MG: 800 TABLET, FILM COATED ORAL at 07:33

## 2025-06-30 RX ADMIN — FERROUS SULFATE TAB 325 MG (65 MG ELEMENTAL FE) 325 MG: 325 (65 FE) TAB at 09:14

## 2025-06-30 RX ADMIN — PRENATAL WITH FERROUS FUM AND FOLIC ACID 1 TABLET: 3080; 920; 120; 400; 22; 1.84; 3; 20; 10; 1; 12; 200; 27; 25; 2 TABLET ORAL at 09:14

## 2025-06-30 RX ADMIN — DOCUSATE SODIUM 100 MG: 100 CAPSULE, LIQUID FILLED ORAL at 09:14

## 2025-06-30 ASSESSMENT — PAIN SCALES - GENERAL: PAINLEVEL_OUTOF10: 2
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
## 2025-06-30 NOTE — DISCHARGE INSTRUCTIONS
Thank you for the opportunity to care for you and your family.    We hope that you are happy with the care we provided during your stay in the [facility]. We want to ensure that you have the help you need when you leave the hospital. If there is anything we can assist you with, please let us know.    Breastfeeding mothers may contact our lactation specialists with any problems or questions.  [facility] lactation services phone number is [phone].  Leave a message and your call will be returned.    Please refer to the information provided in the postpartum care booklet.  The following are warning signs to remember.    Call 911 if you have:    Chest pain or pressure  Shortness of breath, even at rest  Thoughts of harming yourself or others  Seizures    Call your healthcare provider if you have:    Temperature of 100.4 degrees or higher  Stitches that are not healing        -- Swelling, bleeding, drainage, foul odor, redness or warmth in/around your           stitches, staples, or incision (scar)        -- Bad smelling blood or discharge from the vagina  Vaginal bleeding that has increased         -- Soaking through one pad in an hour        -- You are passing clots larger than the size of a lemon  Red, warm tender area(s) in your breast or calf  Headache that does not get better, even after taking medicine; or headache with vision changes    Remember to notify all healthcare providers from your date of delivery to up to one year after giving birth!    CARING FOR YOURSELF        DIET/ACTIVITY    Eat a well balanced diet focusing on foods high in fiber and protein.  Drink plenty of fluids, especially water.  To avoid constipation you may take a mild stool softener as recommended by your doctor or midwife.  Gradually increase your activity. Resume an exercise regime only after being advised by your doctor or midwife.  When sitting or lying down, keep your legs elevated to reduce swelling.  Avoid
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
## 2025-06-30 NOTE — DISCHARGE SUMMARY
Obstetric Discharge Summary    Admitting Diagnosis  IUP 39.6 weeks  OB History          2    Para   1    Term   1       0    AB   1    Living   1         SAB   0    IAB   0    Ectopic   0    Molar   0    Multiple   0    Live Births   1                Reasons for Admission on 2025  6:54 PM  Encounter for elective induction of labor [Z34.90]  No comment available  Induction of Labor    Prenatal Procedures  None    Intrapartum Procedures        Multiple birth?: No        Spontaneous Vaginal Delivery: Vacuum Extraction and See Labor and Delivery Summary       Postpartum Procedures  None    Postpartum/Operative Complications       [place] Data  Information for the patient's :  Jed Partida [[id number]]   male   Birth Weight: 3.09 kg (6 lb 13 oz)  Discharge With Mother  Complications: No    Discharge Diagnosis:s/p VA-, anemia  Discharge Condition: Stable       Discharge Information  Current Discharge Medication List        START taking these medications    Details   ibuprofen (ADVIL;MOTRIN) 800 MG tablet Take 1 tablet by mouth every 8 hours as needed for Pain  Qty: 30 tablet, Refills: 0      buPROPion (WELLBUTRIN XL) 150 MG extended release tablet Take 1 tablet by mouth daily  Qty: 30 tablet, Refills: 0      ferrous sulfate (IRON 325) 325 (65 Fe) MG tablet Take 1 tablet by mouth daily (with breakfast)  Qty: 30 tablet, Refills: 0           CONTINUE these medications which have NOT CHANGED    Details   acetaminophen (TYLENOL) 500 MG tablet Take 1 tablet by mouth every 6-8 hours as needed for Pain May take up to 1000mg at once. Maximum dose 4000mg per day.  Qty: 360 tablet, Refills: 1      famotidine (PEPCID) 20 MG tablet Take 1 tablet by mouth 2 times daily  Qty: 60 tablet, Refills: 3      Prenatal MV-Min-Fe Fum-FA-DHA (PRENATAL 1 PO) Take 1 tablet by mouth daily           STOP taking these medications       doxylamine-pyridoxine (DICLEGIS) 10-10 MG TBEC Comments:   Reason for Stopping:

## 2025-07-03 ENCOUNTER — HOSPITAL ENCOUNTER (OUTPATIENT)
Age: 20
Discharge: HOME | End: 2025-07-03
Attending: STUDENT IN AN ORGANIZED HEALTH CARE EDUCATION/TRAINING PROGRAM | Admitting: STUDENT IN AN ORGANIZED HEALTH CARE EDUCATION/TRAINING PROGRAM
Payer: MEDICAID

## 2025-07-03 VITALS
RESPIRATION RATE: 18 BRPM | TEMPERATURE: 98.1 F | SYSTOLIC BLOOD PRESSURE: 137 MMHG | OXYGEN SATURATION: 96 % | HEART RATE: 93 BPM | DIASTOLIC BLOOD PRESSURE: 70 MMHG

## 2025-07-03 PROCEDURE — 99211 OFF/OP EST MAY X REQ PHY/QHP: CPT

## 2025-07-03 NOTE — PROGRESS NOTES
Pt verbalized understanding of verbal and written discharge instructions and denies having questions at this time. Pt left OB unit at 0450 ambulatory and in stable condition, accompanied by Mother and infant.   Jessie Zaidi RN

## 2025-07-03 NOTE — PROGRESS NOTES
Called and informed Dr. Crowe of pts arrival to triage and complaints. Dr. Crowe to be out to evaluate pt.  Jessie Zaidi RN

## 2025-07-03 NOTE — PROGRESS NOTES
Called and updated Dr. Eason on Dr. Crowe's exam. Pt ok for d/c and to call office in am and make a follow up appointment.  Jessie Zaidi RN

## 2025-07-03 NOTE — H&P
Department of Obstetrics and Gynecology  Labor and Delivery  House officer Triage Note        SUBJECTIVE:    ***     OBJECTIVE     Vitals:  Vitals:    07/03/25 0418   BP: 137/70   Pulse: 93   Resp: 18   Temp: 98.1 °F (36.7 °C)   SpO2: 96%         CONSTITUTIONAL:  awake, alert, cooperative, no apparent distress, and appears stated age  HEART: RR, skin well perfused  LUNGS: Respirations unlabored, no distress  ABDOMEN:  Soft non tender  MUSCULOSKELETAL:  No LE edema, TTP  NEUROLOGIC: Patellar reflexes *** bilaterally, no clonus     Cervix:  ***/***/***     Fetal Position:  ***     Membranes:  ***     Fetal heart rate:         Baseline Heart Rate:  ***       Accelerations:  ***       Decelerations:  ***       Variability:  ***     Contraction frequency: ***          ASSESSMENT:   ***    PLAN:       ***

## 2025-07-03 NOTE — DISCHARGE INSTRUCTIONS
Call office in the morning for a follow up appointment.      Diet & Activity:    Regular  Limit caffeine consumption  Increase your fluid intake  Eat small, frequent meals    Precautions:    Rise from laying/sitting position to standing slowly  Avoid laying on your back, sidelying positions are best, left side is preferred  If you had a vaginal exam you may have some bloody mucous or brown vaginal discharge  If your doctor/midwife has ordered any medications, please get them filled right away and take all of the medication as it has been prescribed    When to call your doctor:  If you have severe back pain.  Period-like cramps that may come and go. May feel like baby is balling up.  Low, dull backache, not relieved by rest.  Pressure in your vagina or lower abdomen that may feel like the baby is pushing down.  Change in the type or amount of vaginal discharge.  Abdominal cramps that may be accompanied by diarrhea.  4-5 uterine contractions in one hour.  Fluid leaking from your vagina (may or may not be bloody)  If you have vaginal bleeding.  If you have a temperature of 100.6 or more.  If your baby has a decrease in activity. Less than 5 times in an hour.  If you feel you are not getting better or you are concerned.  If you develop a headache, not resolved with your usual interventions.  If you have changes in your vision (blurring or spots in your vision).  If you have burning with urination, urgency or frequency.  If you have pain in your abdomen, up by your ribs.    General Instructions:    Nausea and Vomiting  Keep dry toast/crackers with you to munch on  Eat small frequent meals  Try to keep something in your stomach (don't let your stomach get empty)  Take your time getting up  Avoid smells that make you feel sick  Keep a light snack (e.g. Dry crackers) with you at all times to prevent nausea  Drink plenty of water, low sodium and noncaffeinated drinks.      Travel  Wear seatbelts or safety/lap belts, (use

## 2025-07-03 NOTE — PROGRESS NOTES
Pt presents to triage from [facility] ED via pvt car. Pt states that earlier at home she felt a blood clot coming out and felt as if \"it got stuck on my stitches\". Pt states that she tried to loosen up the clot with her char-bottle and that would not work, so then she tried to get it to come loose with the toilet paper and she felt like there was \"skin coming off\". Pt then stated that she \"freaked out\" and called 911 and they took her to [facility] where she waited in the ED and was never seen by any providers. Will notify Dr. Eason and Dr. Crowe of pts arrival to triage.   Jessie Zaidi RN

## 2025-07-29 ENCOUNTER — OFFICE VISIT (OUTPATIENT)
Dept: PRIMARY CARE CLINIC | Age: 20
End: 2025-07-29
Payer: MEDICAID

## 2025-07-29 VITALS
RESPIRATION RATE: 12 BRPM | HEIGHT: 66 IN | HEART RATE: 91 BPM | OXYGEN SATURATION: 97 % | SYSTOLIC BLOOD PRESSURE: 126 MMHG | TEMPERATURE: 98 F | DIASTOLIC BLOOD PRESSURE: 74 MMHG | WEIGHT: 150 LBS | BODY MASS INDEX: 24.11 KG/M2

## 2025-07-29 DIAGNOSIS — R63.0 DECREASE IN APPETITE: Primary | ICD-10-CM

## 2025-07-29 PROCEDURE — 99213 OFFICE O/P EST LOW 20 MIN: CPT | Performed by: FAMILY MEDICINE

## 2025-07-29 ASSESSMENT — ENCOUNTER SYMPTOMS
RHINORRHEA: 0
VOMITING: 0
DIARRHEA: 0
COLOR CHANGE: 0
BLOOD IN STOOL: 0
NAUSEA: 0
COUGH: 0
SHORTNESS OF BREATH: 0
ABDOMINAL PAIN: 0

## 2025-07-29 ASSESSMENT — PATIENT HEALTH QUESTIONNAIRE - PHQ9
2. FEELING DOWN, DEPRESSED OR HOPELESS: NOT AT ALL
5. POOR APPETITE OR OVEREATING: SEVERAL DAYS
SUM OF ALL RESPONSES TO PHQ QUESTIONS 1-9: 8
3. TROUBLE FALLING OR STAYING ASLEEP: MORE THAN HALF THE DAYS
7. TROUBLE CONCENTRATING ON THINGS, SUCH AS READING THE NEWSPAPER OR WATCHING TELEVISION: SEVERAL DAYS
9. THOUGHTS THAT YOU WOULD BE BETTER OFF DEAD, OR OF HURTING YOURSELF: NOT AT ALL
8. MOVING OR SPEAKING SO SLOWLY THAT OTHER PEOPLE COULD HAVE NOTICED. OR THE OPPOSITE, BEING SO FIGETY OR RESTLESS THAT YOU HAVE BEEN MOVING AROUND A LOT MORE THAN USUAL: NOT AT ALL
SUM OF ALL RESPONSES TO PHQ QUESTIONS 1-9: 8
SUM OF ALL RESPONSES TO PHQ QUESTIONS 1-9: 8
6. FEELING BAD ABOUT YOURSELF - OR THAT YOU ARE A FAILURE OR HAVE LET YOURSELF OR YOUR FAMILY DOWN: NOT AT ALL
1. LITTLE INTEREST OR PLEASURE IN DOING THINGS: SEVERAL DAYS
10. IF YOU CHECKED OFF ANY PROBLEMS, HOW DIFFICULT HAVE THESE PROBLEMS MADE IT FOR YOU TO DO YOUR WORK, TAKE CARE OF THINGS AT HOME, OR GET ALONG WITH OTHER PEOPLE: NOT DIFFICULT AT ALL
SUM OF ALL RESPONSES TO PHQ QUESTIONS 1-9: 8
4. FEELING TIRED OR HAVING LITTLE ENERGY: NEARLY EVERY DAY

## 2025-07-29 NOTE — ASSESSMENT & PLAN NOTE
Stable. Weight check visit scheduled to eval and obtain objective interval measure. She is better for food access, stress should improve with partner being home and change in living situation. She will keep an eye on this and we will eval as planned. Counseled today on nutrition and healthy habits and normal expected weight loss after pregnancy. Will do labs to eval if weight loss becomes worrisome. She was amenable to this. Call back/ED precautions discussed.
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
## 2025-08-12 ENCOUNTER — OFFICE VISIT (OUTPATIENT)
Dept: PRIMARY CARE CLINIC | Age: 20
End: 2025-08-12
Payer: MEDICAID

## 2025-08-12 VITALS
HEART RATE: 99 BPM | BODY MASS INDEX: 24.14 KG/M2 | TEMPERATURE: 98 F | SYSTOLIC BLOOD PRESSURE: 110 MMHG | RESPIRATION RATE: 17 BRPM | WEIGHT: 150.2 LBS | HEIGHT: 66 IN | OXYGEN SATURATION: 99 % | DIASTOLIC BLOOD PRESSURE: 78 MMHG

## 2025-08-12 DIAGNOSIS — Z00.00 HEALTHCARE MAINTENANCE: Primary | ICD-10-CM

## 2025-08-12 DIAGNOSIS — N92.0 MENORRHAGIA WITH REGULAR CYCLE: ICD-10-CM

## 2025-08-12 DIAGNOSIS — D64.9 ANEMIA, UNSPECIFIED TYPE: ICD-10-CM

## 2025-08-12 PROBLEM — Z37.9 VACUUM-ASSISTED VAGINAL DELIVERY: Status: RESOLVED | Noted: 2025-06-28 | Resolved: 2025-08-12

## 2025-08-12 PROCEDURE — 99395 PREV VISIT EST AGE 18-39: CPT | Performed by: FAMILY MEDICINE

## 2025-08-12 ASSESSMENT — ENCOUNTER SYMPTOMS
COLOR CHANGE: 0
VOMITING: 0
BLOOD IN STOOL: 0
RHINORRHEA: 0
DIARRHEA: 0
CONSTIPATION: 0
SHORTNESS OF BREATH: 0
COUGH: 0
ABDOMINAL PAIN: 1
NAUSEA: 0

## 2025-08-19 DIAGNOSIS — N92.0 MENORRHAGIA WITH REGULAR CYCLE: ICD-10-CM

## 2025-08-19 DIAGNOSIS — D64.9 ANEMIA, UNSPECIFIED TYPE: ICD-10-CM

## 2025-08-20 LAB
ACANTHOCYTES BLD QL SMEAR: ABNORMAL
BASOPHILS # BLD: 0.1 K/UL (ref 0–0.2)
BASOPHILS NFR BLD: 0.9 %
DEPRECATED RDW RBC AUTO: 20.8 % (ref 12.4–15.4)
EOSINOPHIL # BLD: 0.3 K/UL (ref 0–0.6)
EOSINOPHIL NFR BLD: 4.1 %
HCT VFR BLD AUTO: 38.9 % (ref 36–48)
HGB BLD-MCNC: 12.7 G/DL (ref 12–16)
IRON SATN MFR SERPL: 8 % (ref 15–50)
IRON SERPL-MCNC: 29 UG/DL (ref 37–145)
LYMPHOCYTES # BLD: 1.4 K/UL (ref 1–5.1)
LYMPHOCYTES NFR BLD: 22.3 %
MCH RBC QN AUTO: 24.5 PG (ref 26–34)
MCHC RBC AUTO-ENTMCNC: 32.7 G/DL (ref 31–36)
MCV RBC AUTO: 74.8 FL (ref 80–100)
MONOCYTES # BLD: 0.6 K/UL (ref 0–1.3)
MONOCYTES NFR BLD: 9 %
NEUTROPHILS # BLD: 4.2 K/UL (ref 1.7–7.7)
NEUTROPHILS NFR BLD: 63.7 %
OVALOCYTES BLD QL SMEAR: ABNORMAL
PLATELET # BLD AUTO: 275 K/UL (ref 135–450)
PMV BLD AUTO: 8.5 FL (ref 5–10.5)
POIKILOCYTOSIS BLD QL SMEAR: ABNORMAL
RBC # BLD AUTO: 5.2 M/UL (ref 4–5.2)
TIBC SERPL-MCNC: 360 UG/DL (ref 260–445)
TSH SERPL DL<=0.005 MIU/L-ACNC: 0.66 UIU/ML (ref 0.27–4.2)
WBC # BLD AUTO: 6.5 K/UL (ref 4–11)

## 2025-08-23 ENCOUNTER — RESULTS FOLLOW-UP (OUTPATIENT)
Dept: PRIMARY CARE CLINIC | Age: 20
End: 2025-08-23

## 2025-08-27 ENCOUNTER — OFFICE VISIT (OUTPATIENT)
Dept: PRIMARY CARE CLINIC | Age: 20
End: 2025-08-27
Payer: MEDICAID

## 2025-08-27 ENCOUNTER — RESULTS FOLLOW-UP (OUTPATIENT)
Dept: PRIMARY CARE CLINIC | Age: 20
End: 2025-08-27

## 2025-08-27 VITALS
TEMPERATURE: 101 F | HEIGHT: 66 IN | WEIGHT: 150.6 LBS | BODY MASS INDEX: 24.2 KG/M2 | OXYGEN SATURATION: 98 % | RESPIRATION RATE: 21 BRPM | HEART RATE: 137 BPM

## 2025-08-27 DIAGNOSIS — R50.9 FEVER, UNSPECIFIED FEVER CAUSE: Primary | ICD-10-CM

## 2025-08-27 LAB
INFLUENZA A ANTIBODY: NEGATIVE
INFLUENZA B ANTIBODY: NEGATIVE
S PYO AG THROAT QL: NORMAL
SARS-COV-2 RNA RESP QL NAA+PROBE: NOT DETECTED

## 2025-08-27 PROCEDURE — 87804 INFLUENZA ASSAY W/OPTIC: CPT | Performed by: FAMILY MEDICINE

## 2025-08-27 PROCEDURE — 99213 OFFICE O/P EST LOW 20 MIN: CPT | Performed by: FAMILY MEDICINE

## 2025-08-27 PROCEDURE — 87880 STREP A ASSAY W/OPTIC: CPT | Performed by: FAMILY MEDICINE

## 2025-08-27 ASSESSMENT — ENCOUNTER SYMPTOMS
COLOR CHANGE: 0
VOMITING: 0
DIARRHEA: 0
NAUSEA: 0
RHINORRHEA: 1
ABDOMINAL PAIN: 0
SORE THROAT: 1
BLOOD IN STOOL: 0
COUGH: 1
SHORTNESS OF BREATH: 0

## 2025-08-30 LAB
BACTERIA THROAT AEROBE CULT: ABNORMAL
BACTERIA THROAT AEROBE CULT: ABNORMAL
ORGANISM: ABNORMAL